# Patient Record
Sex: MALE | Race: WHITE | Employment: FULL TIME | URBAN - METROPOLITAN AREA
[De-identification: names, ages, dates, MRNs, and addresses within clinical notes are randomized per-mention and may not be internally consistent; named-entity substitution may affect disease eponyms.]

---

## 2018-02-15 ENCOUNTER — HOSPITAL ENCOUNTER (OUTPATIENT)
Dept: NUTRITION | Age: 78
Discharge: HOME | End: 2018-02-15
Payer: MEDICARE

## 2018-02-15 PROCEDURE — 97802 MEDICAL NUTRITION INDIV IN: CPT

## 2018-02-15 NOTE — PROGRESS NOTES
[facility] AT [place] - Outpatient Nutrition Services  [address] [place], [address], [facility], [address] [facility]  Phone: [phone]  Fax: [phone]   Nutrition Assessment  Medical Nutrition Therapy   Outpatient Initial Evaluation         Patient Name: Radha Lynch : 1940   Treatment Diagnosis: Type 2 diabetes, hyperlipidemia, HTN   Referral Source: Reji Winn DO Start of Care [facility]): 2/15/2018     Gender: male Age: 78 y.o. Ht: 72 In Wt: 214 lb   BMI: 29.1 BMR   Male 2701 BMR Female    Anthropometrics Assessment: Per BMI, pt is considered overweight     Past Medical History includes: Depression, h/o alcoholism, acid reflux, hypothyroidism     Pertinent Medications:   Amlodipine, venlafixine, omeprazole, Janumet, glimepiride, metoprolol, Ca/Vit D, fenofibrate, lipitor, synthroid, Ergo   Biochemical Data:   A1c was 7% (at goal) and Glu was elevated at 171 on 10/31/17. Trig- ^211, HDL- 38 (low). Subjective/Assessment:   Pt lives with his wife and works a sedentary job. He does most of the grocery shopping and they both cook. He does not exercise at all. He has not checked his BG for over a month because he was told he is under control. Pt denies having any low BG levels. Current Eating Patterns: He usually consumes 3 meals and 1-2 snacks per day. Meal and snack timing is not appropriate as he is eating too soon after meals and also over consuming calories. Not all meals are balanced and his carb intake is inconsistent throughout the day (sometimes too many carbs at one meal). For example, pt may have half a blueberry bagel and banana shortly after waking, but then have 2 packets of fruit-flavored oatmeal with added steel cut oats an hour later. Lunch may just be chicken breast only. Dinner is better balanced, such as 5-6 oz salmon, tater tots x10, vegetables tossed in olive oil. He snacks after dinner on half bag of popcorn or processed PB & 10 crackers or a popsicle.  He drinks water, black coffee and no alcohol (eliminated it last May) or soda. Occasionally drinks juice. Estimate Nutrition Needs   Calories:  1800 Protein: 113 Carbs: 203 Fat: 60   Kcal/day  g/day  g/day  g/day        percent: 25  45  30               Education & Recommendations provided: Educated pt on the pathophysiology of Type II Diabetes and insulin resistance and the rationale for dietary modification and increased activity. Educated pt on carbohydrate food sources, counting carbohydrates, label reading, and meal timing. Discussed the Healthy Plate Method and appropriate portion sizes of different food groups. Explained the importance of combining carbohydrates with protein at meals and reviewed foods that contain each nutrient. Handouts Provided: [x]  Carbohydrates  [x]  Protein  []  Fiber  [x]  Serving Sizes  [x]  Snack Ideas  []  Food Journals [x]  Diabetes  []  Facts about Fats  [x]  List of Non-starchy Vegetables  [x]  Gen Nutr Guidelines  []  SBGM Guidelines  []  Others:   Information Reviewed with: pt   Readiness to Change Stage: []  Pre-contemplative    []  Contemplative  [x]  Preparation               []  Action                  []  Maintenance   Potential Barriers to Learning: []  Decline in memory    []  Language barrier   []  Other:  []  Emotional                  []  Limited mobility      [x]  None  []  Lack of motivation     [] Vision, hearing or cognitive impairment   Expected Compliance: Good     Nutritional Goal - To promote lifestyle changes to result in:    []  Weight loss  [x]  Improved blood glucose control  []  Decreased cholesterol levels  []  Decreased blood pressure  []  Weight maintenance []  Preventing any interactions associated with food allergies  []  Adequate weight gain toward goal weight  []  Other:        Patient Goals: - Pt will combine carbohydrates with a protein source at every meal and snack.  - Pt will eat within 2 hours of waking and eat a meal every 4-5 hours while awake.  If longer than 5 hours between meals, pt will have a snack. Avoid skipping meals.  - Pt will follow the healthy plate method to ensure meals are balanced and to keep carbohydrate intake limited and consistent throughout the day.      Dietitian Signature: She Vinson RD Date: 2/15/2018   Follow-up: Thu, 3/15/18 @4:30pm Time: 4:47 PM

## 2018-09-04 ENCOUNTER — HOSPITAL ENCOUNTER (OUTPATIENT)
Dept: PHYSICAL THERAPY | Age: 78
Discharge: HOME | End: 2018-09-04
Payer: MEDICARE

## 2018-09-04 PROCEDURE — G9171 VOICE CURRENT STATUS: HCPCS

## 2018-09-04 PROCEDURE — 92522 EVALUATE SPEECH PRODUCTION: CPT

## 2018-09-04 PROCEDURE — G9172 VOICE GOAL STATUS: HCPCS

## 2018-09-04 NOTE — PROGRESS NOTES
ST DAILY TREATMENT NOTE/VOICE EVALUATION [facility]  [facility] 
[address] [facility] Ph: [phone]  Fax: [phone] Plan of Care/ Statement of Necessity for Speech Therapy Services Patient name: Clare Lynch Start of Care: 2018 Referral source: Natasha Walker MD : 1940 Medical Diagnosis: Dysphonia [R49.0] Onset Date: 8-9 months Treatment Diagnosis: Dysphonia Prior Hospitalization: see medical history Provider#: [id number] Medications: Verified on Patient summary List  
 Comorbidities: Acid reflux, pollen allergy Prior Level of Function: Independent, , prior [employer] Patient Name: Edwin Lipoma Date:2018 : 1940 [x]  Patient  Verified Payor: [place] MED[place]ARE / Plan: [address] / Product Type: Medicare / In time: 1010  Out time: 1100 Total Treatment Time (min): 50 Visit #: 1 of 4-5 SUBJECTIVE Pain Level (0-10 scale): 0 Any medication changes, allergies to medications, adverse drug reactions, diagnosis change, or new procedure performed?: [] No    [x] Yes (see summary sheet for update) Subjective functional status/changes:   [] No changes reported Patient readily participating in evaluation Radiology: Allergic nasal stranding, mild LPR changes and slight TCV bowing per laryngoscopy OBJECTIVE 
OUTPATIENT VOICE EVALUATION Description of Problem: Patient presents to voice evaluation with complaints of hoarseness impacting his ability to communicate at work, home and social life. Patient reports hoarseness began ~8-9 months ago, no sore throat, begins around 10 AM daily, no improvement noted with vocal rest and fluid intake. Patient has acid reflux and pollen allergy per medical history. Currently takes omeprazole for reflux, reports he is inconsistent with this medication. Of note, pt reports copious mold/mildew in his work office. Onset of Problem: 8-9 months Variability through Day: Begins to worsen ~10 AM daily Voice Usage: 5-7 hours per day Know Abuse/Misuse: Overuse, throat clearing Pitch:   [x] WNL  [] Low  [] High     Comments: 
Loudness: [x] WNL  [] Excessive [] Inadequate     Comments: 
Quality:  [] WNL       Comments: Mildly hoarse Resonance: [x] WNL  [] Hypernasal [] Hyponasal     Comments: 
Rate:  [x] WNL  [] Fast  [] Slow     Comments: 
Range:  [] WNL  [] Montone [] Excessive variability    Comments: 
Observations [] Pitch Breaks [] Glottal Carvel Gómez [] Stridency [] Hard Glottal Attacks 
  [] Aphonia [] Diplophonia [] Phonation Breaks [x] Mild Fluctuations on Quality    []Other:  
  [] Aphonia interspersed with intermittent phonation Vowel prolongation /a/ (a measure of respiratory/phonatory efficiency): Duration: 13 seconds; Intensity: 62.2 dB   Vocal quality: Hoarse Vowel prolongation /i/ (ee): Duration: 11  seconds; Intensity: 54.3 dB   Vocal quality: Hoarse S/Z Ratio: S= 8.61  Z= 11.88  S/Z= . 67 Is breathing audible? [x] Yes [] No 
Is phonation on inhalation? [] Yes [x] No 
Is breathing pattern irregular? [] Yes [x] No 
Does patient have difficulty sustaining expiration? [] Yes [x] No 
Does patient focus on breathing? [] Yes [x] No 
Clavicular breathing? [x] Yes [] No 
Reduced respiratory/speech coordination? [] Yes [x] No 
 
Frequent coughing? [] Yes [x] No  
Frequent throat clearing? [x] Yes [] No  
Extrinsic laryngeal tension? [] Yes [x] No 
Visible tension present: []neck  [] chest  [] mandible Evidence of tight throat during phonation? [] Yes [x] No 
Complaints of tired throat? [] Yes [x] No 
Tone focus: []retracted tongue  [] closed mouth   [x]appropriate [] back throat focus Perceptual assessment:  Consensus Auditory Perceptual Evaluation of Voice (CAPE-V) was administered by this SLP: On a scale of 0 to 100 with 100 equaling the most extreme deviance the patient attained the following scores: (see attached in paper chart):  Overall Severity: 10/100 consistent; mildly deviant, roughness: 10/100 consistent mildly deviant , breathiness 0/100 consistent WFL; strain: 10/100 consistent, mildly deviant; pitch 0/100 consistent WFL; loudness: 0/100 consistent WFL. Patient completed Voice Handicap Index (VHI): (see attached in paper chart): This scores agreement or disagreement for statements that many people have used to describe their voices and the effects of their voices on their lives. It is self scored with score choices 0 through 4. Zero indicates 'never handicapping' and 4 indicating 'always handicapping'. The higher the score the more handicapping. There are 40 points possible for each of the 3 sections and also a total handicapping score of 120 points. For functional measure, pt attained a score of 7 points (mild handicapping); for physical measure, pt scored 10  points (mild handicapping); and for emotional measure, pt scored 6 points (mild handicapping). Patient attained a total score of 23 points (mild handicapping). The patient then self-rated a score of 5 on a 10 point scale (moderately talkative). Speech: 
 Oral Verbal Apraxia: [x] None     [] Mild [] Moderate [] Severe Dysarthria:  [x] None     [] Mild [] Moderate [] Severe Intelligibility  [x] WNL     [] Words [] Phrases [] Sentences  
    [] Conversation % intelligible: 
 Agrammatic:  [] Yes       [x] No 
Hearing screened by:  PCP Passed [x] Yes [] No  Comments:  
Fluency:   [x]WNL  []Dysfluent   Comments: Motor Speech: [x]Articulation [facility] []Apraxia  []oral []verbal  ( []min []mod []severe) []Dysarthria Intelligibility: 
Deviations noted:   [x]none  []yes, describe: Auditory Comprehension: [x] WFL [] Impaired - describe: (subjective) Verbal Expression:   [x] WFL [] Impaired - describe: (subjective) Reading comprehension: [x] WFL [] Impaired - describe: (subjective) Cognitive skills:   [x] WFL [] Impaired - describe: (subjective) Patient/Caregiver instruction/education: [x] Review HEP    [] Progressed/Changed HEP/Handouts given: Vocal hygiene guidelines Pain Level (0-10 scale) post treatment: 0  
 
ASSESSMENT [x]  See Plan of Care Problem List:   [place] Stage Treatment Plan may include any combination of the following: Voice Treatment Patient / Family readiness to learn indicated by: asking questions, trying to perform skills and interest 
 
Persons(s) to be included in education:   patient (P) Barriers to Learning/Limitations: None Patient Goal (s): To find out why this happens Patient Self Reported Health Status: fair Rehabilitation Potential: good Short Term Goals: To be accomplished in 4-5 treatments Patient will: 1. Utilize vocal hygiene (decrease throat clearing and caffeine intake; increase water intake), min A-mod I visual/verbal cues in 4/5 trials. 2. Perform diaphragmatic breathing for effective phonation with min A-mod I visual/verbal cues in 4/5 trials. 3 .Perform vocal function exercises (i.e. Reyes's) to increase vocal quality/strength with min A with visual/verbal/tactile cues and model in 4/5 trials. 4. Complete increased pitch/phonation exercises with adequate breath support/coordination at sound-conversation level to decrease pitch breaks in order to increase vocal quality in conversation in 4/5 trials given min A to include visual/verbal/tactile/kinesthetic cues/models Long Term Goals: To be accomplished in 10-12 weeks Pt will utilize best quality voice with least physical effort with good vocal hygiene and voice strategies with min A, for participation in communication ADLs and social and work activities. Frequency / Duration: Patient to be seen 1 times per week for 4 weeks: 
 
G Codes: 
Voice:  Voice Current Status CI= 1-19%  Voice Goal Status CH= 0% 
 
 The severity rating is based on the following outcomes:   
National Outcomes Measures (NOMS) Patient/ Caregiver education and instruction: Diagnosis, prognosis, exercises, vocal hygiene guidelines PLAN 
[]  Upgrade activities as tolerated     [x]  Continue plan of care 
[]  Discharge due to:__ 
[] Other:__ Certification Period: 9/4/18-10/4/18 DARINEL Taylor 9/4/2018 10:10 AM 
______________________________________________________________________ I certify that the above Therapy Services are being furnished while the patient is under my care. I agree with the treatment plan and certify that this therapy is necessary. [de-identified] Signature:____________________  Date:___________Time:_________ Please sign and return to [address]. Ph: [phone]  Fax: [phone]

## 2018-09-13 ENCOUNTER — APPOINTMENT (OUTPATIENT)
Dept: PHYSICAL THERAPY | Age: 78
End: 2018-09-13
Payer: MEDICARE

## 2018-09-20 ENCOUNTER — HOSPITAL ENCOUNTER (OUTPATIENT)
Dept: PHYSICAL THERAPY | Age: 78
Discharge: HOME | End: 2018-09-20
Payer: MEDICARE

## 2018-09-20 PROCEDURE — 92507 TX SP LANG VOICE COMM INDIV: CPT

## 2018-09-20 NOTE — PROGRESS NOTES
ST DAILY TREATMENT NOTE Patient Name: Noemí Garcia Date:2018 : 1940 [x]  Patient  Verified Payor: Payor: Tatiana Redman / Plan: [place] MEDICARE PART A & B / Product Type: Medicare / In time: 830  Out time: 900 Total Treatment Time (min): 30 Visit #: 2 of 8-10 Treatment Diagnosis: Dysphonia [R49.0] SUBJECTIVE Pain Level (0-10 scale): 0 Any medication changes, allergies to medications, adverse drug reactions, diagnosis change, or new procedure performed?: [] No    [] Yes (see summary sheet for update) Subjective functional status/changes:   [] No changes reported Patient reports taking medication consistently. Notes new awareness of throat clearing. Patient reports fluid intake on an average day: ~20 oz water, 2 cups coffee, 2 sodas. OBJECTIVE Treatment provided includes: 
Increase/Improve: 
[x]  Voice Quality []  Cognitive Linguistic Skills []  Laryngeal/Pharyngeal Exercises  
[]  Vocal Loudness []  Reading Comprehension []  Swallowing Skills   
[]  Vocal Cord Function []  Auditory Comprehension []  Oral Motor Skills  
[]  Resonance []  Writing Skills []  Compensatory strategies   
[]  Speech Intelligibility []  Expressive Language []  Attention []  Breath Support/Coord. []  Receptive language []  Memory []  Articulation []  Safety Awareness []   
[]  Fluency []  Word Retrieval [] Treatment Provided: 
Reviewed vocal hygiene guidelines Introduced diaphragmatic breathing exercises; completed with Wilner Patient/Caregiver  Education: [x] Review HEP     
HEP/Handouts given: Vocal hygiene guidelines, diaphragmatic breathing exercises Pain Level (0-10 scale) post treatment: 0 
 
ASSESSMENT [x]   Improving appropriately and progressing toward goals 
[]   Improving slowly and progressing toward goals 
[]   Approximating goals/maximum potential 
[x]   Continues to benefit from skilled therapy to address remaining functional deficits []   Not progressing toward goals and plan of care will be adjusted Patient will continue to benefit from skilled therapy to address remaining functional deficits: dysphonia Progress towards goals / Updated goals: 
First treatment session completed following initial assessment. Reviewed vocal hygiene guidelines and introduced diaphragmatic breathing exercises with modA. Patient with decreased awareness of vocal overuse and throat clearing, noted 6x within 30 minute session. PLAN [x]  Continue plan of care 
[]  Modify Goals/Treatment Plan     
[]  Discharge due to: 
[] Other: 
 
Short-Term Goals: 
Patient will: 1. Utilize vocal hygiene (decrease throat clearing and caffeine intake; increase water intake), min A-mod I visual/verbal cues in 4/5 trials. 2. Perform diaphragmatic breathing for effective phonation with min A-mod I visual/verbal cues in 4/5 trials. 3 .Perform vocal function exercises (i.e. Reyes's) to increase vocal quality/strength with min A with visual/verbal/tactile cues and model in 4/5 trials. 4. Complete increased pitch/phonation exercises with adequate breath support/coordination at sound-conversation level to decrease pitch breaks in order to increase vocal quality in conversation in 4/5 trials given min A to include visual/verbal/tactile/kinesthetic cues/models ADRINEL Archer 9/20/2018  8:34 AM 
 
Future Appointments Date Time Provider Chiquita Rinaldi 9/27/2018 9:30 AM [address] [address]

## 2018-10-04 ENCOUNTER — HOSPITAL ENCOUNTER (OUTPATIENT)
Dept: PHYSICAL THERAPY | Age: 78
Discharge: HOME | End: 2018-10-04
Payer: MEDICARE

## 2018-10-04 PROCEDURE — 92507 TX SP LANG VOICE COMM INDIV: CPT

## 2018-10-04 NOTE — PROGRESS NOTES
[facility] @ [address] Ph: [phone]  Fax: [phone] Continued Plan of Care/ Re-certification for Speech Therapy Services Patient name: Greg Lynch Start of Care: 18 Referral source: Kulwinder Alcocer MD : 1940 Medical/Treatment Diagnosis: Dysphonia [R49.0] Onset Date: 8-9 months Prior Hospitalization: see medical history Provider#: [id number] Medications: Verified on Patient Summary List   
Comorbidities: Acid reflux, pollen allergy Prior Level of Function: Independent, , prior [facility] Visits from Start of Care: 3    Missed Visits: 1 The Plan of Care and following information is based on the patient's current status: 
Goal: Utilize vocal hygiene (decrease throat clearing and caffeine intake; increase water intake), min A-mod I visual/verbal cues in 4/5 trials. Status at last note/certification: New goal at [facility] 
Current Status: not met, progressing - Patient reports decreased throat clearing and caffeine intake, increased water intake, modA, demo continued throat clearing x4 in tx Goal: Perform diaphragmatic breathing for effective phonation with min A-mod I visual/verbal cues in 4/5 trials. Status at last note/certification: New goal at [facility] 
Current Status: not met - Performed diaphragmatic breathing exercises with maxA Goal: Perform vocal function exercises (i.e. Reyes's) to increase vocal quality/strength with min A with visual/verbal/tactile cues and model in 4/5 trials. Status at last note/certification: New goal at [facility] 
Current Status: not met - not yet addressed Goal:Complete increased pitch/phonation exercises with adequate breath support/coordination at sound-conversation level to decrease pitch breaks in order to increase vocal quality in conversation in 4/5 trials given min A to include visual/verbal/tactile/kinesthetic cues/models Status at last note/certification: New goal at [facility]  
Current Status: not met - completed increased pitch/phonation exercises with modA Key functional changes: Patient reports taking double his usual dose of reflux medication beginning ~1 week ago. Reports he has increased fluid intake, decreased caffeine and decreased throat clearing. Patient reported significant improvement in vocal quality. Has not completed vocal exercises from [facility] for two weeks. Problems/ barriers to goal attainment: None Problem List: Ash Moore Treatment Plan: Voice Treatment Patient Goal (s) has been updated and includes: \"See if my voice will go back to normal\" Goals for this certification period to be accomplished in 4-5 treatments: 
Patient will: 1. Utilize vocal hygiene (decrease throat clearing and caffeine intake; increase water intake), min A-mod I visual/verbal cues in 4/5 trials. 2. Perform diaphragmatic breathing for effective phonation with min A-mod I visual/verbal cues in 4/5 trials. 3 .Perform vocal function exercises (i.e. Reyes's) to increase vocal quality/strength with min A with visual/verbal/tactile cues and model in 4/5 trials. 4. Complete increased pitch/phonation exercises with adequate breath support/coordination at sound-conversation level to decrease pitch breaks in order to increase vocal quality in conversation in 4/5 trials given min A to include visual/verbal/tactile/kinesthetic cues/models Frequency / Duration: Patient to be seen 1 time per week for 4 weeks: 
 
Assessment/Recommendations: Patient is demonstrating steady progress towards goals. Patient reports improved vocal quality with increased water intake, decreased caffeine intake and changes in medication regimen. Reports decreased throat clearing, however continues to require frequent cues in therapy to avoid throat clearing.  At this time, patient has completed pitch/phonation exercises and diaphragmatic breathing exercises with mod-maxA. Patient is motivated to improve voice, however does require frequent verbal redirection to attend to tasks throughout the session. Patient continues to benefit from skilled ST services to address hoarseness. G Codes: 
Voice: [id number] Voice Current Status CI= 1-19%  Voice Goal Status CH= 0% The severity rating is based on the following outcomes:   
National Outcomes Measures (NOMS) Certification Period: 10/4/18-11/3/18 DARINEL Silva 10/4/2018 10:57 AM 
 
_____________________________________________________________________ I certify that the above Therapy Services are being furnished while the patient is under my care. I agree with the treatment plan and certify that this therapy is necessary. []  I have read the above report and request that my patient continue as recommended. []  I have read the above report and request that my patient continue therapy with the following changes/special instructions:________________________________________ []I have read the above report and request that my patient be discharged from therapy. [de-identified] Signature:_________________ Date:___________Time:__________ Please sign and return via fax to [facility] at [facility] Fax: [phone]

## 2018-10-11 ENCOUNTER — HOSPITAL ENCOUNTER (OUTPATIENT)
Dept: PHYSICAL THERAPY | Age: 78
Discharge: HOME | End: 2018-10-11
Payer: MEDICARE

## 2018-10-11 PROCEDURE — 92507 TX SP LANG VOICE COMM INDIV: CPT

## 2018-10-11 NOTE — PROGRESS NOTES
[facility]  [address]/[facility] [address] Ph: [phone]  Fax: [phone] Speech Therapy Daily Note Discharge Summary Date:10/11/2018 Patient name: Marylin Lynch Start of Care: 18 Referral source: Sung Evans MD : [id number] Medical/Treatment Diagnosis: Dysphonia [R49.0] Onset Date: 8-9 months Prior Hospitalization: see medical history Provider#: [id number] Medications: Verified on Patient Summary List   
Comorbidities: Acid reflux, pollen allergy Prior Level of Function: Independent, , prior [employer] Visits from Start of Care: 4    Missed Visits: 1 Reporting Period : 10/4/18 to 10/11/18 [x]  Patient  Verified Payor: [place] [facility] / Plan: [employer] [facility] / Product Type: Medicare / In time: 830  Out time: 900 Total Treatment Time (min): 30 Visit #: 1 of 4-5 SUBJECTIVE Pain Level (0-10 scale): 0 Any medication changes, allergies to medications, adverse drug reactions, diagnosis change, or new procedure performed?: [x] No    [] Yes (see summary sheet for update) Subjective functional status/changes:   [] No changes reported Patient reports baseline vocal quality OBJECTIVE Treatment provided includes: 
Increase/Improve: 
[x]  Voice Quality []  Cognitive Linguistic Skills []  Laryngeal/Pharyngeal Exercises  
[]  Vocal Loudness []  Reading Comprehension []  Swallowing Skills   
[]  Vocal Cord Function []  Auditory Comprehension []  Oral Motor Skills  
[]  Resonance []  Writing Skills []  Compensatory strategies   
[]  Speech Intelligibility []  Expressive Language []  Attention []  Breath Support/Coord. []  Receptive language []  Memory []  Articulation []  Safety Awareness []   
[]  Fluency []  Word Retrieval [] Treatment Provided: - Reviewed vocal hygiene guidelines - Completed diaphragmatic breathing exercises with Lupe Patient/Caregiver  Education: [x] Review HEP     
 HEP/Handouts given: Vocal hygiene guidelines, vocal exercises Pain Level (0-10 scale) post treatment: 0 Summary of Care: 
Goal: Utilize vocal hygiene (decrease throat clearing and caffeine intake; increase water intake), min A-mod I visual/verbal cues in 4/5 trials. Status at last note/certification: Patient reports decreased throat clearing and caffeine intake, increased water intake, modA, demo continued throat clearing x4 in tx Status at discharge: met - Independently utilizing vocal hygiene with Lupe Goal: Perform diaphragmatic breathing for effective phonation with min A-mod I visual/verbal cues in 4/5 trials. Status at last note/certification: Performed diaphragmatic breathing exercises with maxA Status at discharge: met - performed diaphragmatic breathing exercises with Lupe Goal: Perform vocal function exercises (i.e. Reyes's) to increase vocal quality/strength with min A with visual/verbal/tactile cues and model in 4/5 trials. Status at last note/certification: not yet addressed Status at discharge: not met - not addressed Goal: Complete increased pitch/phonation exercises with adequate breath support/coordination at sound-conversation level to decrease pitch breaks in order to increase vocal quality in conversation in 4/5 trials given min A to include visual/verbal/tactile/kinesthetic cues/models Status at last note/certification: completed increased pitch/phonation exercises with modA Status at discharge: met - completed increased pitch/phonation exercises with Lupe ASSESSMENT: Patient has reached set goals. Patient with improvement in vocal quality with use of vocal hygiene guidelines and changes by MD in medication regimen. Patient reports significant reduction in hoarseness for the past two weeks, stating that his voice is currently at baseline vocal quality. Continued voice therapy no longer indicated.  Patient agreeable to discharge plan. Will discharge from therapy accordingly. RECOMMENDATIONS: 
[]Discontinue therapy: [x]Patient has reached or is progressing toward set goals []Patient is non-compliant or has abdicated 
    []Due to lack of appreciable progress towards set goals DARINEL Kent 10/11/2018 8:40 AM

## 2018-10-18 ENCOUNTER — APPOINTMENT (OUTPATIENT)
Dept: PHYSICAL THERAPY | Age: 78
End: 2018-10-18
Payer: MEDICARE

## 2018-10-25 ENCOUNTER — APPOINTMENT (OUTPATIENT)
Dept: PHYSICAL THERAPY | Age: 78
End: 2018-10-25
Payer: MEDICARE

## 2018-10-30 ENCOUNTER — APPOINTMENT (OUTPATIENT)
Dept: PHYSICAL THERAPY | Age: 78
End: 2018-10-30
Payer: MEDICARE

## 2019-02-22 PROBLEM — R31.0 GROSS HEMATURIA: Status: ACTIVE | Noted: 2019-02-22
